# Patient Record
(demographics unavailable — no encounter records)

---

## 2025-06-02 NOTE — PHYSICAL EXAM
[MA] : MA [FreeTextEntry2] : GIA ALERT [Soft] : soft [Non-tender] : non-tender [Non-distended] : non-distended [FreeTextEntry7] : Obesity [Examination Of The Breasts] : a normal appearance [No Masses] : no breast masses were palpable [Labia Majora] : normal [Labia Minora] : normal [Normal] : normal [Uterine Adnexae] : normal

## 2025-06-02 NOTE — HISTORY OF PRESENT ILLNESS
[FreeTextEntry1] : ROGER ECHOLS 31 YO, presents for Annual & STI testing. G 1 P 1001 -1  LMP: 25 - Regular menses Sexually active, using condoms No medication PGM has history of breast cancer. To do monthly SBE.  Partner might have cheated.  C/O mild vaginal odor. HIV & STI Counseling done.  No other health issues, nonsmoker, NKDA.  For pelvic sonogram today. To f/u in 2 weeks for results.